# Patient Record
(demographics unavailable — no encounter records)

---

## 2025-05-13 NOTE — PHYSICAL EXAM
[FreeTextEntry1] : General: Patient is awake and alert and in no acute distress. oriented to person, place. well developed, well nourished, cooperative.   Skin: The skin is intact, warm, pink, and dry over the area examined.   Eyes: normal conjunctiva, normal eyelids and pupils were equal and round.   ENT: normal ears, normal nose and normal lips.  Cardiovascular: There is brisk capillary refill in the digits of the affected extremity. They are symmetric pulses in the bilateral upper and lower extremities, positive peripheral pulses, brisk capillary refill, but no peripheral edema.  Respiratory: The patient is in no apparent respiratory distress. They're taking full deep breaths without use of accessory muscles or evidence of audible wheezes or stridor without the use of a stethoscope, normal respiratory effort.   Neurological: 5/5 motor strength in the main muscle groups of bilateral lower extremities, sensory intact in bilateral lower extremities.   Musculoskeletal: Good posture. normal clinical alignment in upper and lower extremities. full range of motion in bilateral upper and lower extremities.  right ankle :  is walking with very mild limping. minimal swelling and tenderness above ATFL. no bony tenderness above malleoli,  base of 5 mts, or along the fibula and tibia shaft. NV intact able to DF/PF the ankle.

## 2025-05-13 NOTE — REVIEW OF SYSTEMS
[Change in Activity] : no change in activity [Fever Above 102] : no fever [Itching] : no itching [Redness] : no redness [Wheezing] : no wheezing [Cough] : no cough [Change in Appetite] : no change in appetite [Vomiting] : no vomiting [Limping] : no limping [Joint Pains] : arthralgias [Joint Swelling] : no joint swelling [Sleep Disturbances] : ~T no sleep disturbances

## 2025-05-13 NOTE — HISTORY OF PRESENT ILLNESS
[FreeTextEntry1] : Bayron is a pleasant  15 yo male who came today to my office with his  mom   for evaluation of right ankle sprain. He sprained His  right ankle while  playing soccer on 05/07/25  He immediate experienced pain with any attempt of moving his ankle or bear weight, He was seen in urgent care, Xray was done - no fracture was diagnosed, but since he had a lot of pain he was instructed to follow with peds ortho. He is here today doing  better, here for further evaluation of the above

## 2025-05-13 NOTE — REASON FOR VISIT
[Initial Evaluation] : an initial evaluation [FreeTextEntry1] : right ankle injury [Patient] : patient [Mother] : mother

## 2025-05-13 NOTE — ASSESSMENT
[FreeTextEntry1] : 14 yo male with right ankle sprain after injury in a soccer game, 1 week ago, doing better today Today's visit included obtaining history from the child  parent due to the child's age, the child could not be considered a reliable historian, requiring parent to act as independent historian. Xray was reviewed today confirming no fracture and Long discussion was done with family regarding  diagnosis, treatment options and prognosis recommendations: Weight bearing as tolerated pain  medication as needed Follow up in as needed restriction from activities for 2 weeks. note was provided. This plan was discussed with family. Family verbalizes understanding and agreement of plan. All questions and concerns were addressed today.